# Patient Record
Sex: MALE | Race: WHITE | NOT HISPANIC OR LATINO | Employment: FULL TIME | URBAN - NONMETROPOLITAN AREA
[De-identification: names, ages, dates, MRNs, and addresses within clinical notes are randomized per-mention and may not be internally consistent; named-entity substitution may affect disease eponyms.]

---

## 2018-07-12 ENCOUNTER — OFFICE VISIT (OUTPATIENT)
Dept: NEUROSURGERY | Facility: CLINIC | Age: 43
End: 2018-07-12

## 2018-07-12 VITALS — BODY MASS INDEX: 24.05 KG/M2 | WEIGHT: 168 LBS | HEIGHT: 70 IN

## 2018-07-12 DIAGNOSIS — R20.0 NUMBNESS OF FINGERS: ICD-10-CM

## 2018-07-12 DIAGNOSIS — M54.2 NECK PAIN: Primary | ICD-10-CM

## 2018-07-12 DIAGNOSIS — F17.200 SMOKER: ICD-10-CM

## 2018-07-12 DIAGNOSIS — M79.601 PAIN OF RIGHT UPPER EXTREMITY: ICD-10-CM

## 2018-07-12 PROCEDURE — 99204 OFFICE O/P NEW MOD 45 MIN: CPT | Performed by: NURSE PRACTITIONER

## 2018-07-12 RX ORDER — TRAMADOL HYDROCHLORIDE 50 MG/1
50 TABLET ORAL EVERY 6 HOURS PRN
Qty: 90 TABLET | Refills: 0 | Status: SHIPPED | OUTPATIENT
Start: 2018-07-12

## 2018-07-12 RX ORDER — TRAMADOL HYDROCHLORIDE 50 MG/1
50 TABLET ORAL EVERY 6 HOURS PRN
COMMUNITY
End: 2018-07-12

## 2018-07-12 RX ORDER — PREDNISONE 20 MG/1
20 TABLET ORAL DAILY
COMMUNITY
End: 2018-08-22

## 2018-07-12 RX ORDER — CYCLOBENZAPRINE HCL 10 MG
10 TABLET ORAL 3 TIMES DAILY PRN
COMMUNITY
End: 2018-07-16 | Stop reason: SDUPTHER

## 2018-07-12 NOTE — PROGRESS NOTES
"Neurosurgery Initial Patient Visit    Patient: Roberto Camarena  : 1975    Primary Care Provider: Telma Alacron MD    Requesting Provider: LUCIE Malave      History    Chief Complaint:   Chief Complaint   Patient presents with   • Neck Pain     Neck pain radiating down the R arm to the 3,4and 5th digits.  Pt states that this started in .  He states that it has become worse, but he does state that he has better and worse days       History of Present Illness: He is a 42-year-old  male who presents with chief complaint of \"pain in hand, arm and fingers.\"  He is referred by LUCIE Frost, and we have been asked to see him in consultation for further evaluation.    He gives history of having chronic neck issues since .  He had been serving in the  and states that there were multiple events that could have hurt his neck, including falling down a flight of steps.  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  He complains that he now has increased neck pain with a constant pain down his right arm.  In the past it has only been intermittent.  This radiates mostly into the third, fourth, and fifth fingers.  He has some numbness and tingling and thinks that he may be weaker.  Presently, he does not have any symptoms on the left.  He is currently in physical therapy and has had 2 sessions.  He reports that traction yesterday seemed to make him much worse.  He has had a cervical x-ray that he brings in on disc for review.  He is currently being treated with medications that seem to give him some relief.    Allergies:   Ciprofloxacin    Past Medical History:    Past Medical History:   Diagnosis Date   • Claustrophobia        Past Surgical History: History reviewed. No pertinent surgical " history.    Medications:  Prednisone, cyclobenzaprine PRN, Tramadol PRN  No current outpatient prescriptions on file prior to visit.     No current facility-administered medications on file prior to visit.         Social History:   reports that he has been smoking.  He has been smoking about 0.08 packs per day. He has never used smokeless tobacco. He reports that he does not drink alcohol or use drugs.  Again, he is employed as a teacher and  for commercial painters.  He is  and lives in [place].  He is in the process of complete smoking cessation, and is doing this along with a family member.  He is currently down to 3 cigarettes per day.  With regards to alcohol, he consumes a glass of wine for special occasions.    Family History:    Family History   Problem Relation Age of Onset   • Arthritis Mother    • Asthma Mother    • Cancer Father         lung   • Diabetes Father    • Heart disease Father    • Hypertension Father        Review of Systems:  Review of Systems   Constitutional: Positive for activity change and fatigue. Negative for chills, fever and unexpected weight change.   HENT: Negative for congestion, hearing loss, rhinorrhea, sore throat and tinnitus.    Eyes: Negative for photophobia and visual disturbance.   Respiratory: Negative for cough, shortness of breath and wheezing.    Cardiovascular: Negative for chest pain and palpitations.   Gastrointestinal: Negative for constipation, diarrhea, nausea and vomiting.   Genitourinary: Negative for difficulty urinating.   Musculoskeletal: Positive for back pain, joint swelling, neck pain and neck stiffness. Negative for arthralgias and gait problem.   Skin: Negative for rash.   Allergic/Immunologic: Negative for environmental allergies.   Neurological: Positive for weakness, numbness and headaches. Negative for seizures.   Hematological: Does not bruise/bleed easily.   Psychiatric/Behavioral: Positive for agitation and sleep disturbance. Negative  for confusion. The patient is not nervous/anxious.    All other systems reviewed and are negative.      Neurological Physical Examination    Physical Exam   Constitutional: He is oriented to person, place, and time. He appears well-developed and well-nourished. No distress.   He is pleasant and cooperative, in no acute distress.   HENT:   Head: Normocephalic and atraumatic.   Eyes: No scleral icterus.   Neck: Neck supple. No tracheal deviation and normal range of motion present.   Cardiovascular: Normal rate, regular rhythm and normal heart sounds.    No murmur heard.  Pulmonary/Chest: Effort normal and breath sounds normal. No respiratory distress. He has no wheezes.   Respirations even and unlabored with no acute distress.   Musculoskeletal:   Cervical range of motion is limited, particularly with right lateral movement and extension.  There is weakness of right , and I question is also with right triceps.  Deep tendon reflexes 1-2+.  Sara's negative.   Neurological: He is alert and oriented to person, place, and time. He displays normal reflexes. No cranial nerve deficit.   Optic discs not visualized.   Skin: Skin is warm and dry. No rash noted.   Psychiatric: He has a normal mood and affect. His speech is normal and behavior is normal. Cognition and memory are normal.   Vitals reviewed.       Medical Decision Making    Management Options:  In the office we have discussed his care.  I have reviewed the cervical x-ray on disc from [facility].  This shows straightening of the cervical spine, indicating probable acute muscle spasm and also spondylosis.    He had talked about this as well as a continued plan for care.  I have encouraged him to go ahead with all efforts at physical therapy, hoping for improvement.  With regards to medication, he was under the impression that he would only be prescribed enough medication to last until his appointment here with us.  He is getting good relief with tramadol and  so I have refilled his prescription.  We will see him back in another 4-6 weeks in follow-up from therapy.  If he has not had improvement in his pain as well as weakness that I noted on today's exam, then we will need to proceed with an MRI of the cervical spine.  He is agreeable with our plan.    Cervical x-ray is here in the office on disc for further review as needed.    I have congratulated him on his efforts towards smoking cessation.  Additional information has been given in an effort to help support overall health and wellness.    Roberto was seen today for neck pain.    Diagnoses and all orders for this visit:    Neck pain  -     traMADol (ULTRAM) 50 MG tablet; Take 1 tablet by mouth Every 6 (Six) Hours As Needed for Moderate Pain .    Pain of right upper extremity  -     traMADol (ULTRAM) 50 MG tablet; Take 1 tablet by mouth Every 6 (Six) Hours As Needed for Moderate Pain .    Numbness of fingers  -     traMADol (ULTRAM) 50 MG tablet; Take 1 tablet by mouth Every 6 (Six) Hours As Needed for Moderate Pain .    BMI 24.0-24.9, adult    Smoker        Thank you, LUCIE Malave, for this Consultation and the opportunity to participate in the care of this pleasant patient.

## 2018-07-12 NOTE — PATIENT INSTRUCTIONS
Steps to Quit Smoking  Smoking tobacco can be harmful to your health and can affect almost every organ in your body. Smoking puts you, and those around you, at risk for developing many serious chronic diseases. Quitting smoking is difficult, but it is one of the best things that you can do for your health. It is never too late to quit.  What are the benefits of quitting smoking?  When you quit smoking, you lower your risk of developing serious diseases and conditions, such as:  · Lung cancer or lung disease, such as COPD.  · Heart disease.  · Stroke.  · Heart attack.  · Infertility.  · Osteoporosis and bone fractures.    Additionally, symptoms such as coughing, wheezing, and shortness of breath may get better when you quit. You may also find that you get sick less often because your body is stronger at fighting off colds and infections. If you are pregnant, quitting smoking can help to reduce your chances of having a baby of low birth weight.  How do I get ready to quit?  When you decide to quit smoking, create a plan to make sure that you are successful. Before you quit:  · Pick a date to quit. Set a date within the next two weeks to give you time to prepare.  · Write down the reasons why you are quitting. Keep this list in places where you will see it often, such as on your bathroom mirror or in your car or wallet.  · Identify the people, places, things, and activities that make you want to smoke (triggers) and avoid them. Make sure to take these actions:  ? Throw away all cigarettes at home, at work, and in your car.  ? Throw away smoking accessories, such as ashtrays and lighters.  ? Clean your car and make sure to empty the ashtray.  ? Clean your home, including curtains and carpets.  · Tell your family, friends, and coworkers that you are quitting. Support from your loved ones can make quitting easier.  · Talk with your health care provider about your options for quitting smoking.  · Find out what treatment  options are covered by your health insurance.    What strategies can I use to quit smoking?  Talk with your healthcare provider about different strategies to quit smoking. Some strategies include:  · Quitting smoking altogether instead of gradually lessening how much you smoke over a period of time. Research shows that quitting “cold turkey” is more successful than gradually quitting.  · Attending in-person counseling to help you build problem-solving skills. You are more likely to have success in quitting if you attend several counseling sessions. Even short sessions of 10 minutes can be effective.  · Finding resources and support systems that can help you to quit smoking and remain smoke-free after you quit. These resources are most helpful when you use them often. They can include:  ? Online chats with a counselor.  ? Telephone quitlines.  ? Printed self-help materials.  ? Support groups or group counseling.  ? Text messaging programs.  ? Mobile phone applications.  · Taking medicines to help you quit smoking. (If you are pregnant or breastfeeding, talk with your health care provider first.) Some medicines contain nicotine and some do not. Both types of medicines help with cravings, but the medicines that include nicotine help to relieve withdrawal symptoms. Your health care provider may recommend:  ? Nicotine patches, gum, or lozenges.  ? Nicotine inhalers or sprays.  ? Non-nicotine medicine that is taken by mouth.    Talk with your health care provider about combining strategies, such as taking medicines while you are also receiving in-person counseling. Using these two strategies together makes you more likely to succeed in quitting than if you used either strategy on its own.  If you are pregnant or breastfeeding, talk with your health care provider about finding counseling or other support strategies to quit smoking. Do not take medicine to help you quit smoking unless told to do so by your health care  provider.  What things can I do to make it easier to quit?  Quitting smoking might feel overwhelming at first, but there is a lot that you can do to make it easier. Take these important actions:  · Reach out to your family and friends and ask that they support and encourage you during this time. Call telephone quitlines, reach out to support groups, or work with a counselor for support.  · Ask people who smoke to avoid smoking around you.  · Avoid places that trigger you to smoke, such as bars, parties, or smoke-break areas at work.  · Spend time around people who do not smoke.  · Lessen stress in your life, because stress can be a smoking trigger for some people. To lessen stress, try:  ? Exercising regularly.  ? Deep-breathing exercises.  ? Yoga.  ? Meditating.  ? Performing a body scan. This involves closing your eyes, scanning your body from head to toe, and noticing which parts of your body are particularly tense. Purposefully relax the muscles in those areas.  · Download or purchase mobile phone or tablet apps (applications) that can help you stick to your quit plan by providing reminders, tips, and encouragement. There are many free apps, such as QuitGuide from the CDC (Centers for Disease Control and Prevention). You can find other support for quitting smoking (smoking cessation) through smokefree.gov and other websites.    How will I feel when I quit smoking?  Within the first 24 hours of quitting smoking, you may start to feel some withdrawal symptoms. These symptoms are usually most noticeable 2-3 days after quitting, but they usually do not last beyond 2-3 weeks. Changes or symptoms that you might experience include:  · Mood swings.  · Restlessness, anxiety, or irritation.  · Difficulty concentrating.  · Dizziness.  · Strong cravings for sugary foods in addition to nicotine.  · Mild weight gain.  · Constipation.  · Nausea.  · Coughing or a sore throat.  · Changes in how your medicines work in your  body.  · A depressed mood.  · Difficulty sleeping (insomnia).    After the first 2-3 weeks of quitting, you may start to notice more positive results, such as:  · Improved sense of smell and taste.  · Decreased coughing and sore throat.  · Slower heart rate.  · Lower blood pressure.  · Clearer skin.  · The ability to breathe more easily.  · Fewer sick days.    Quitting smoking is very challenging for most people. Do not get discouraged if you are not successful the first time. Some people need to make many attempts to quit before they achieve long-term success. Do your best to stick to your quit plan, and talk with your health care provider if you have any questions or concerns.  This information is not intended to replace advice given to you by your health care provider. Make sure you discuss any questions you have with your health care provider.  Document Released: 12/12/2002 Document Revised: 08/15/2017 Document Reviewed: 05/03/2016  [employer] Interactive Patient Education © 2018 [employer]

## 2018-07-16 ENCOUNTER — TELEPHONE (OUTPATIENT)
Dept: NEUROSURGERY | Facility: CLINIC | Age: 43
End: 2018-07-16

## 2018-07-16 DIAGNOSIS — M79.601 PAIN OF RIGHT UPPER EXTREMITY: ICD-10-CM

## 2018-07-16 DIAGNOSIS — M54.2 NECK PAIN: Primary | ICD-10-CM

## 2018-07-16 RX ORDER — CYCLOBENZAPRINE HCL 10 MG
10 TABLET ORAL 3 TIMES DAILY PRN
Qty: 90 TABLET | Refills: 2 | Status: SHIPPED | OUTPATIENT
Start: 2018-07-16

## 2018-07-16 NOTE — TELEPHONE ENCOUNTER
Pt called and stated that he has run out of the muscle relaxer, steroid and the Ultram aren't helping.  I told the patient that typically steroids aren't long term and that I didn't think that the Ultram would likely be changed.  He said to just let you know that they aren't helping.

## 2018-07-16 NOTE — TELEPHONE ENCOUNTER
I had noted in his chart that Ultram was helping with his pain.  I am ok with refilling a muscle relaxer - that was possibly helping him more anyway.  We can refill Flexeril if he would like.

## 2018-08-22 ENCOUNTER — OFFICE VISIT (OUTPATIENT)
Dept: NEUROSURGERY | Facility: CLINIC | Age: 43
End: 2018-08-22

## 2018-08-22 VITALS — BODY MASS INDEX: 22.54 KG/M2 | HEIGHT: 71 IN | WEIGHT: 161 LBS

## 2018-08-22 DIAGNOSIS — M79.601 PAIN OF RIGHT UPPER EXTREMITY: ICD-10-CM

## 2018-08-22 DIAGNOSIS — M51.36 DEGENERATIVE DISC DISEASE, LUMBAR: Primary | ICD-10-CM

## 2018-08-22 DIAGNOSIS — M54.2 NECK PAIN: ICD-10-CM

## 2018-08-22 DIAGNOSIS — R20.0 NUMBNESS OF FINGERS: ICD-10-CM

## 2018-08-22 DIAGNOSIS — M54.50 CHRONIC RIGHT-SIDED LOW BACK PAIN WITHOUT SCIATICA: ICD-10-CM

## 2018-08-22 DIAGNOSIS — G89.29 CHRONIC RIGHT-SIDED LOW BACK PAIN WITHOUT SCIATICA: ICD-10-CM

## 2018-08-22 DIAGNOSIS — F17.200 SMOKER: ICD-10-CM

## 2018-08-22 PROCEDURE — 99213 OFFICE O/P EST LOW 20 MIN: CPT | Performed by: NURSE PRACTITIONER

## 2018-08-22 NOTE — PROGRESS NOTES
"Neurosurgery Follow Up Office Visit      Patient Name:  Roberto Camarena  Age:  42 y.o.  YOB: 1975  MR#:  [id number]    Ht 180.3 cm (71\")   Wt 73 kg (161 lb)   BMI 22.45 kg/m²     Social History   Substance Use Topics   • Smoking status: Current Every Day Smoker     Packs/day: 0.08   • Smokeless tobacco: Never Used      Comment: 3 per day   • Alcohol use No       Chief Complaint   Patient presents with   • Follow-up     4 wk f/u after PT.  Pt states that PT didn't seem to help at all.  He feels that it might have made him worse.  He feels some better after stopping PT.          History  Chief Complaint:  He returns today for follow-up from physical therapy directed at neck and right arm pain.  He states that he completed all of his sessions and that he is a little bit better, but not as good as he needs to be.  He is still having neck pain this radiates down the right arm into the third, fourth, and fifth fingers.  He has numbness and tingling as well as some weakness.  Pain is constant, but varies in severity.  He has been bothered more recently bilateral low back issues.  This is been a chronic problem for him since around 2008.  He relates intermittent flareups of pain that only affect his low back.  He does not relate that he has had any radicular feature into either leg.  Today, he has brought in an x-ray and MRI of his lumbar spine.      Physical Examination:  Upon exam, he looks pretty good.  He has been anxious and a little worried about getting his test results today.  He is awake and alert, pleasant and cooperative, speech is clear and appropriate.  Skin is warm and dry.  Cervical range of motion is reasonable but elicits pain.  There is a weakness of right pronation on exam that is reproducible.  Deep tendon reflexes 1-2+ bilaterally.  Sara's is negative.  To the lower extremities, straight leg raising is unrestricted.  Deep tendon reflexes 1-2+ and symmetric.  No definite focal " weakness.      Medical Decision Making  Treatment Options:   In the office, I have reviewed a lumbar x-ray and MRI on disc.  The patient has a lumbosacral transitional vertebra.  By my numbering I have also counted only 5 lumbar vertebra and no disc degeneration and bulging at L3 4 that is greater to the left and at L4 5 that is greater to the left.  The patient has asked questions about spina bifida, but I do not appreciate any resulting abnormalities related to that on his imaging.    We have talked about physical therapy directed at his low back, which is presently his main issue.  He tells me he has done this in the recent past and that it did not help him.  We have talked about the possibility of pain management and he is agreeable.  He states that he does not have frequent flareups, but when he does they are severe.  He would be interesting in considering injections at least on an intermittent basis.  I think this is very reasonable as presently he does not have radicular features into the lower extremities and therefore no clear surgical findings on his lumbar imaging.    Guards to his neck, he is still concerned about the degree of pain that he has.  He did not respond to physical therapy and we need to proceed with an MRI of the cervical spine to determine if he is a surgical candidate for those issues.  We will see him back after the testing and again in the meantime make referral to pain management in [place].  In the meantime, he continues to work.  He is agreeable.    Lumbar imaging is here in the office on file for further review as needed.    Information regarding smoking cessation has been given in an effort to help support overall health and wellness.      Assessment and Plan  Roberto was seen today for follow-up.    Diagnoses and all orders for this visit:    Degenerative disc disease, lumbar  -     Ambulatory Referral to Pain Management    Chronic right-sided low back pain without sciatica  -      Ambulatory Referral to Pain Management    Neck pain  -     Ambulatory Referral to Pain Management  -     MRI Cervical Spine Without Contrast; Future    Pain of right upper extremity  -     Ambulatory Referral to Pain Management  -     MRI Cervical Spine Without Contrast; Future    Numbness of fingers  -     MRI Cervical Spine Without Contrast; Future    BMI 22.0-22.9, adult    Smoker        Return in about 4 weeks (around 9/19/2018).      Criselda London, APRN

## 2018-09-19 ENCOUNTER — OFFICE VISIT (OUTPATIENT)
Dept: NEUROSURGERY | Facility: CLINIC | Age: 43
End: 2018-09-19

## 2018-09-19 VITALS — WEIGHT: 164 LBS | HEIGHT: 71 IN | BODY MASS INDEX: 22.96 KG/M2

## 2018-09-19 DIAGNOSIS — F17.200 SMOKER: ICD-10-CM

## 2018-09-19 DIAGNOSIS — M79.602 LEFT ARM PAIN: ICD-10-CM

## 2018-09-19 DIAGNOSIS — G89.29 CHRONIC RIGHT-SIDED LOW BACK PAIN WITHOUT SCIATICA: ICD-10-CM

## 2018-09-19 DIAGNOSIS — M54.50 CHRONIC RIGHT-SIDED LOW BACK PAIN WITHOUT SCIATICA: ICD-10-CM

## 2018-09-19 DIAGNOSIS — M54.2 NECK PAIN: Primary | ICD-10-CM

## 2018-09-19 DIAGNOSIS — M79.601 PAIN OF RIGHT UPPER EXTREMITY: ICD-10-CM

## 2018-09-19 PROCEDURE — 99213 OFFICE O/P EST LOW 20 MIN: CPT | Performed by: NURSE PRACTITIONER

## 2018-09-19 NOTE — PROGRESS NOTES
"Neurosurgery Follow Up Office Visit      Patient Name:  Roberto Camarena  Age:  42 y.o.  YOB: 1975  MR#:  [id number]    Ht 180.3 cm (71\")   Wt 74.4 kg (164 lb)   BMI 22.87 kg/m²     Social History   Substance Use Topics   • Smoking status: Current Every Day Smoker     Packs/day: 0.08   • Smokeless tobacco: Never Used      Comment: 3 per day   • Alcohol use No       Chief Complaint   Patient presents with   • Follow-up     4 wk f/u with MRI.  Pt states that his L arm has been hurting since he was here last.  His R arm isn't hurting all the way down to his fingers anymore.  It seems to just be in his arm.           History  Chief Complaint:  He returns today for follow-up of neck and right arm pain with a cervical MRI for review.  His wife accompanies him today.  This is been the patient's primary complaint.  He had been prescribed physical therapy that helped him a little, but not as much as he needed.  He had describe radicular pain into the third, fourth, and fifth fingers on the right.  He tells me now that he is having numbness and tingling in both arms and it seems to be worse on the left.  It is now more confined to the upper arm and he does not have as many symptoms below the elbow.    Also lifted imaging of his lumbar spine.  I did not identify a clear surgical lesion.  He still has no radicular features into either lower extremity and for his chronic low back pain, he has a pain management appointment on December 7.      Physical Examination:  Upon exam, he looks good.  He is awake and alert, pleasant and cooperative, speech is clear and appropriate.  Skin is warm and dry.  Cervical range of motion is most limited with lateral movement.  Today, there is still weakness of pronation, but I think it is greater on the left.  Deep tendon reflexes 1-2+ bilaterally.  Sara's negative.  Question of positive Tinel's at both elbows and at the right wrist.  Weakness of thumb abduction bilaterally.  " A slight sponginess to the right thenar muscle.  Positive Phalen's bilaterally.      Medical Decision Making  Treatment Options:   In the office we have discussed his care.  I reviewed the cervical MRI on disc from [facility].  Primary finding is at C5 6, where there is spurring as well as disc degeneration and bulging.  This causes relative foraminal narrowing that appears to be greater on the right.  It may well be symptomatic.    We have talked about this and continued care.  He is agreeable to EMG and nerve conduction testing.  He may also have a component of bilateral carpal tunnel.  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  He also mentions that they will be relocating to [place] in March, as his wife is in the [employer].  He will likely need help with referral records once they are established care.  In the meantime, his primary provider has agreed to give one additional prescription of Ultram.  He has requested to be placed on the cancellation list with pain management.  We will see him back to review additional testing, and they are agreeable.    Cervical and lumbar imaging is here in the office on file for further review as needed.    Information regarding smoking cessation has been given in an effort to help support overall health and wellness.      Assessment and Plan  Roberto was seen today for follow-up.    Diagnoses and all orders for this visit:    Neck pain  -     EMG & Nerve Conduction Test; Future    Pain of right upper extremity  -     EMG & Nerve Conduction Test; Future    Left arm pain  -     EMG & Nerve Conduction Test; Future    Chronic right-sided low back pain without sciatica    BMI 22.0-22.9, adult    Smoker        Return for F/u with Dr. Arugeta.      LUCIE Barragan

## 2018-09-21 DIAGNOSIS — M79.601 PAIN OF RIGHT UPPER EXTREMITY: ICD-10-CM

## 2018-09-21 DIAGNOSIS — M54.2 NECK PAIN: ICD-10-CM

## 2018-09-21 DIAGNOSIS — R20.0 NUMBNESS OF FINGERS: ICD-10-CM

## 2018-10-16 ENCOUNTER — APPOINTMENT (OUTPATIENT)
Dept: NEUROLOGY | Facility: HOSPITAL | Age: 43
End: 2018-10-16